# Patient Record
Sex: MALE | Race: WHITE | Employment: UNEMPLOYED | URBAN - METROPOLITAN AREA
[De-identification: names, ages, dates, MRNs, and addresses within clinical notes are randomized per-mention and may not be internally consistent; named-entity substitution may affect disease eponyms.]

---

## 2018-05-29 ENCOUNTER — APPOINTMENT (OUTPATIENT)
Dept: GENERAL RADIOLOGY | Age: 53
End: 2018-05-29
Attending: EMERGENCY MEDICINE
Payer: MEDICARE

## 2018-05-29 ENCOUNTER — HOSPITAL ENCOUNTER (EMERGENCY)
Age: 53
Discharge: HOME | End: 2018-05-30
Attending: EMERGENCY MEDICINE
Payer: MEDICARE

## 2018-05-29 VITALS — RESPIRATION RATE: 20 BRPM | TEMPERATURE: 99.3 F

## 2018-05-29 DIAGNOSIS — M25.569 ARTHRALGIA OF LOWER LEG, UNSPECIFIED LATERALITY: Primary | ICD-10-CM

## 2018-05-29 LAB
ALBUMIN SERPL-MCNC: 3.6 G/DL (ref 3.4–5)
ALBUMIN/GLOB SERPL: 0.9 {RATIO} (ref 0.8–1.7)
ALP SERPL-CCNC: 121 U/L (ref 45–117)
ALT SERPL-CCNC: 15 U/L (ref 16–61)
ANION GAP SERPL CALC-SCNC: 9 MMOL/L (ref 3–18)
AST SERPL-CCNC: 12 U/L (ref 15–37)
BASOPHILS # BLD: 0 K/UL (ref 0–0.1)
BASOPHILS NFR BLD: 0 % (ref 0–2)
BILIRUB SERPL-MCNC: 0.4 MG/DL (ref 0.2–1)
BUN SERPL-MCNC: 7 MG/DL (ref 7–18)
BUN/CREAT SERPL: 8 (ref 12–20)
CALCIUM SERPL-MCNC: 8.8 MG/DL (ref 8.5–10.1)
CHLORIDE SERPL-SCNC: 107 MMOL/L (ref 100–108)
CO2 SERPL-SCNC: 22 MMOL/L (ref 21–32)
CREAT SERPL-MCNC: 0.91 MG/DL (ref 0.6–1.3)
DIFFERENTIAL METHOD BLD: ABNORMAL
EOSINOPHIL # BLD: 0 K/UL (ref 0–0.4)
EOSINOPHIL NFR BLD: 0 % (ref 0–5)
ERYTHROCYTE [DISTWIDTH] IN BLOOD BY AUTOMATED COUNT: 18 % (ref 11.6–14.5)
GLOBULIN SER CALC-MCNC: 3.9 G/DL (ref 2–4)
GLUCOSE SERPL-MCNC: 90 MG/DL (ref 74–99)
HCT VFR BLD AUTO: 32.1 % (ref 36–48)
HGB BLD-MCNC: 9.8 G/DL (ref 13–16)
INR PPP: 1.4 (ref 0.8–1.2)
LYMPHOCYTES # BLD: 0.9 K/UL (ref 0.9–3.6)
LYMPHOCYTES NFR BLD: 7 % (ref 21–52)
MCH RBC QN AUTO: 23.1 PG (ref 24–34)
MCHC RBC AUTO-ENTMCNC: 30.5 G/DL (ref 31–37)
MCV RBC AUTO: 75.5 FL (ref 74–97)
MONOCYTES # BLD: 1.1 K/UL (ref 0.05–1.2)
MONOCYTES NFR BLD: 9 % (ref 3–10)
NEUTS SEG # BLD: 10.3 K/UL (ref 1.8–8)
NEUTS SEG NFR BLD: 84 % (ref 40–73)
PLATELET # BLD AUTO: 287 K/UL (ref 135–420)
PLATELET COMMENTS,PCOM: ABNORMAL
PMV BLD AUTO: 10.2 FL (ref 9.2–11.8)
POTASSIUM SERPL-SCNC: 3.6 MMOL/L (ref 3.5–5.5)
PROT SERPL-MCNC: 7.5 G/DL (ref 6.4–8.2)
PROTHROMBIN TIME: 16.8 SEC (ref 11.5–15.2)
RBC # BLD AUTO: 4.25 M/UL (ref 4.7–5.5)
RBC MORPH BLD: ABNORMAL
SODIUM SERPL-SCNC: 138 MMOL/L (ref 136–145)
WBC # BLD AUTO: 12.3 K/UL (ref 4.6–13.2)

## 2018-05-29 PROCEDURE — 80053 COMPREHEN METABOLIC PANEL: CPT | Performed by: EMERGENCY MEDICINE

## 2018-05-29 PROCEDURE — 73502 X-RAY EXAM HIP UNI 2-3 VIEWS: CPT

## 2018-05-29 PROCEDURE — 85610 PROTHROMBIN TIME: CPT | Performed by: EMERGENCY MEDICINE

## 2018-05-29 PROCEDURE — 85025 COMPLETE CBC W/AUTO DIFF WBC: CPT | Performed by: EMERGENCY MEDICINE

## 2018-05-29 PROCEDURE — 99284 EMERGENCY DEPT VISIT MOD MDM: CPT

## 2018-05-29 PROCEDURE — 93971 EXTREMITY STUDY: CPT

## 2018-05-29 RX ORDER — WARFARIN 2 MG/1
2 TABLET ORAL DAILY
COMMUNITY

## 2018-05-29 NOTE — ED NOTES
Bedside shift change report given to LIN Villanueva (oncoming nurse) by Dustin Parry RN (offgoing nurse). Report included the following information SBAR, ED Summary, Intake/Output, MAR and Recent Results. RN also informed of pt having an LVAD with three backup batteries.

## 2018-05-29 NOTE — ED TRIAGE NOTES
Pt brought in by EMS for evaluation of bilateral leg cramps. Pt is normally able to walk around but has not been able to do so today. EMS report that pt developed SOB upon arrival. Pt is an LVAD pt.

## 2018-05-30 NOTE — DISCHARGE INSTRUCTIONS
Musculoskeletal Pain: Care Instructions  Your Care Instructions  Different problems with the bones, muscles, nerves, ligaments, and tendons in the body can cause pain. One or more areas of your body may ache or burn. Or they may feel tired, stiff, or sore. The medical term for this type of pain is musculoskeletal pain. It can have many different causes. Sometimes the pain is caused by an injury such as a strain or sprain. Or you might have pain from using one part of your body in the same way over and over again. This is called overuse. In some cases, the cause of the pain is another health problem such as arthritis or fibromyalgia. The doctor will examine you and ask you questions about your health to help find the cause of your pain. Blood tests or imaging tests like an X-ray may also be helpful. But sometimes doctors can't find a cause of the pain. Treatment depends on your symptoms and the cause of the pain, if known. The doctor has checked you carefully, but problems can develop later. If you notice any problems or new symptoms, get medical treatment right away. Follow-up care is a key part of your treatment and safety. Be sure to make and go to all appointments, and call your doctor if you are having problems. It's also a good idea to know your test results and keep a list of the medicines you take. How can you care for yourself at home? · Rest until you feel better. · Do not do anything that makes the pain worse. Return to exercise gradually if you feel better and your doctor says it's okay. · Be safe with medicines. Read and follow all instructions on the label. ¨ If the doctor gave you a prescription medicine for pain, take it as prescribed. ¨ If you are not taking a prescription pain medicine, ask your doctor if you can take an over-the-counter medicine. · Put ice or a cold pack on the area for 10 to 20 minutes at a time to ease pain. Put a thin cloth between the ice and your skin.   When should you call for help? Call your doctor now or seek immediate medical care if:  · You have new pain, or your pain gets worse. · You have new symptoms such as a fever, a rash, or chills. Watch closely for changes in your health, and be sure to contact your doctor if:  · You do not get better as expected. Where can you learn more? Go to [employer] [facility]  Enter Q624 in the search box to learn more about \"Musculoskeletal Pain: Care Instructions. \"   © 0845-9210 [employer], [employer]. Care instructions adapted under license by [employer] (which disclaims liability or warranty for this information). This care instruction is for use with your licensed healthcare professional. If you have questions about a medical condition or this instruction, always ask your healthcare professional. [address] any warranty or liability for your use of this information.   Content Version: 96.7.917028; Current as of: November 20, 2015

## 2018-05-30 NOTE — PROCEDURES
[facility]  *** FINAL REPORT ***    Name: Jose Angel Yanez  MRN: [id number]  : 1965  HIS Order #: [id number]  [id number] [facility] Visit #: [id number]  Date: 29 May 2018    TYPE OF TEST: Peripheral Venous Testing    REASON FOR TEST  Limb swelling    Right Leg:-  Deep venous thrombosis:           No  Superficial venous thrombosis:    No  Deep venous insufficiency:        Not examined  Superficial venous insufficiency: Not examined      INTERPRETATION/FINDINGS  Duplex images were obtained using 2-D gray scale, color flow, and  spectral Doppler analysis. Right leg :  1. Deep vein(s) visualized include the common femoral, proximal  femoral, mid femoral, distal femoral, popliteal(above knee),  popliteal(fossa), popliteal(below knee), posterior tibial and peroneal   veins. 2. No evidence of deep venous thrombosis detected in the veins  visualized. 3. No evidence of deep vein thrombosis in the contralateral common  femoral vein. 4. Superficial vein(s) visualized include the great saphenous vein. 5. No evidence of superficial thrombosis detected. ADDITIONAL COMMENTS    I have personally reviewed the data relevant to the interpretation of  this  study.     TECHNOLOGIST: Delmy Salcedo, [facility], RVT/  Signed: 2018 08:58 PM    PHYSICIAN: Ilsa Aguillon MD  Signed: 2018 09:49 AM

## 2018-05-30 NOTE — ED NOTES
Discharge and f/u instructions given to pt and brother. Verbalized understanding. Concerned about going home.  dates in to talk to pt and brother and then discharged. Pt refused help getting dressed. Wheeled out of ED by [facility].

## 2023-01-18 NOTE — ED PROVIDER NOTES
EMERGENCY DEPARTMENT HISTORY AND PHYSICAL EXAM    5:18 PM      Date: 5/29/2018  Patient Name: Crystal Butt    History of Presenting Illness     No chief complaint on file. History Provided By: EMS, patient, patient's brother    Additional History (Context): Crystal Butt is a 47 y.o. male with history of heart failure, LVAD who presents with right leg and hip pain per brother. He states that patient is unable to bear weight. He states that patient has been evaluated for similar complaints in the past. Pt also reports some improving SOB. No other symptoms or concerns were expressed. PCP: None    Chief Complaint: leg pain   Duration:    Timing:    Location: right leg and hip  Quality: Cramping  Severity: unable to bear weight   Modifying Factors: worse with movement  Associated Symptoms: SOB      Current Outpatient Prescriptions   Medication Sig Dispense Refill    oxyCODONE-acetaminophen (PERCOCET) 5-325 mg per tablet Take 1 Tab by mouth every six (6) hours as needed for Pain. Max Daily Amount: 4 Tabs. 10 Tab 0    potassium chloride (K-DUR, KLOR-CON) 10 mEq tablet Take 1 Tab by mouth daily. 30 Tab 1    furosemide (LASIX) 20 mg tablet Take 1 Tab by mouth daily. 30 Tab 1    aspirin delayed-release 81 mg tablet Take 1 Tab by mouth daily. 100 Tab 3       Past History     Past Medical History:  Past Medical History:   Diagnosis Date    Chest pain, unspecified 6/18/2015    Nonspecific abnormal electrocardiogram (ECG) (EKG) 6/18/2015    S Tach, IVCD, NSTT     Other constipation 6/18/2015    Pleural effusion     Shortness of breath 6/18/2015    Tobacco use disorder 6/18/2015    Unspecified combined systolic and diastolic heart failure ([address] 6/18/2015       Past Surgical History:  No past surgical history on file.     Family History:  Family History   Problem Relation Age of Onset    Stroke Brother 25    Heart Attack Neg Hx        Social History:  Social History   Substance Use Topics    Smoking status: Former Smoker     Packs/day: 1.00    Smokeless tobacco: Never Used    Alcohol use 1.2 oz/week     2 Cans of beer per week      Comment: occassionally monthly       Allergies: Allergies   Allergen Reactions    Penicillins Hives         Review of Systems     Review of Systems   Respiratory: Positive for shortness of breath. Musculoskeletal: Positive for myalgias. All other systems reviewed and are negative. Physical Exam   There were no vitals taken for this visit. Physical Exam   Constitutional: He is oriented to person, place, and time. He appears well-developed. Frail; ill appeairng   HENT:   Head: Normocephalic and atraumatic. Eyes: EOM are normal. Pupils are equal, round, and reactive to light. Neck: Normal range of motion. Neck supple. Cardiovascular: Normal rate, regular rhythm and normal heart sounds. Exam reveals no friction rub. No murmur heard. Pulmonary/Chest: Effort normal and breath sounds normal. No respiratory distress. He has no wheezes. Abdominal: Soft. He exhibits no distension. There is no tenderness. There is no rebound and no guarding. Musculoskeletal: Normal range of motion. Right hip pain   Neurological: He is alert and oriented to person, place, and time. Difficulty speaking ;bsaeline    Skin: Skin is warm and dry. Psychiatric: He has a normal mood and affect. His behavior is normal. Thought content normal.         Diagnostic Study Results         Medical Decision Making     5:28 PM  Paging heart failure clinic to discuss the patient. Reviewed recent summary note from the office. 5:46 PM  Brother is in room helping to provide patient's history. States that patient has complained of right leg and hip pain and has been unable to bear weight. He has had similar pain in the past, for which he has been evaluated. Brother would like patient transferred to [facility], patient is more ambivalent. Patient is concerned that he is going to die.  Will get electrolytes and call Dr. Dave Watts of  [facility] again. The patient general.  He reportedly has right hip pain may be similar to prior DVT. His labs are unremarkable. The patient has difficulty expressing himself at his baseline per the brother. He tries to express himself and gets frustrated and says \"I don't know\"     6:47 PM d;w dr Bhavani Lopez, [facility] heart failure phsysician; recommends duplex; (will add xr) and clinic in am if ok. Diagnosis     No diagnosis found. _______________________________    Attestations:  Scribe Attestation     [place] acting as a scribe for and in the presence of Julia Smith MD      May 29, 2018 at 5:18 PM       Provider Attestation:      I personally performed the services described in the documentation, reviewed the documentation, as recorded by the scribe in my presence, and it accurately and completely records my words and actions.  May 29, 2018 at 5:18 PM - Julia Smith MD    _______________________________ show